# Patient Record
Sex: FEMALE | Race: WHITE | URBAN - METROPOLITAN AREA
[De-identification: names, ages, dates, MRNs, and addresses within clinical notes are randomized per-mention and may not be internally consistent; named-entity substitution may affect disease eponyms.]

---

## 2022-12-12 ENCOUNTER — REFRACTIVE (OUTPATIENT)
Dept: URBAN - METROPOLITAN AREA CLINIC 4 | Facility: CLINIC | Age: 22
End: 2022-12-12

## 2022-12-12 DIAGNOSIS — H53.8 OTHER VISUAL DISTURBANCES: Primary | ICD-10-CM

## 2022-12-12 ASSESSMENT — KERATOMETRY
OS: 45.13
OD: 45.13

## 2023-01-23 ENCOUNTER — REFRACTIVE (OUTPATIENT)
Dept: URBAN - METROPOLITAN AREA CLINIC 4 | Facility: CLINIC | Age: 23
End: 2023-01-23

## 2023-01-23 DIAGNOSIS — H53.8 OTHER VISUAL DISTURBANCES: Primary | ICD-10-CM

## 2023-01-23 RX ORDER — DIAZEPAM 5 MG/1
5 MG TABLET ORAL
Qty: 2 | Refills: 0 | Status: INACTIVE
Start: 2023-01-23 | End: 2023-01-23

## 2023-01-23 RX ORDER — DIAZEPAM 5 MG/1
5 MG TABLET ORAL
Qty: 3 | Refills: 0 | Status: ACTIVE
Start: 2023-01-23

## 2023-01-23 ASSESSMENT — VISUAL ACUITY
OS: 20/20
OD: 20/25

## 2023-01-23 ASSESSMENT — INTRAOCULAR PRESSURE
OS: 17
OD: 17

## 2023-01-23 ASSESSMENT — KERATOMETRY
OD: 45.25
OS: 45.00

## 2023-01-25 ENCOUNTER — POST-OPERATIVE VISIT (OUTPATIENT)
Dept: URBAN - METROPOLITAN AREA CLINIC 4 | Facility: CLINIC | Age: 23
End: 2023-01-25

## 2023-01-25 DIAGNOSIS — Z48.810 ENCOUNTER FOR SURGICAL AFTERCARE FOLLOWING SURGERY ON A SENSE ORGAN: Primary | ICD-10-CM

## 2023-01-25 PROCEDURE — 99024 POSTOP FOLLOW-UP VISIT: CPT | Performed by: OPHTHALMOLOGY

## 2023-01-25 NOTE — IMPRESSION/PLAN
Impression: S/P LASIK - [facility] OU - 1 Day. Encounter for surgical aftercare following surgery on a sense organ  Z48.810. Plan: POD#1 s/p LASIK both eyes- healing well, flap lift SPK, 1+ edema. CL placed OU. Continue Pred-Moxi eye drops BID for one for day and QID until next visit. Cont. Vitamin C 1,000mg PO QD, sunglasses protection and preservative free artificial tears [id number] while awake until next visit. Patient instructed to continue precautions of not rubbing the eyes, to sleep with protective goggles until the next visit. F/U in 1 week, sooner PRN.

## 2023-01-28 ENCOUNTER — POST-OPERATIVE VISIT (OUTPATIENT)
Dept: URBAN - METROPOLITAN AREA CLINIC 4 | Facility: CLINIC | Age: 23
End: 2023-01-28

## 2023-01-28 PROCEDURE — 99024 POSTOP FOLLOW-UP VISIT: CPT | Performed by: OPHTHALMOLOGY

## 2023-02-25 ENCOUNTER — POST-OPERATIVE VISIT (OUTPATIENT)
Dept: URBAN - METROPOLITAN AREA CLINIC 4 | Facility: CLINIC | Age: 23
End: 2023-02-25

## 2023-02-25 DIAGNOSIS — Z48.810 ENCOUNTER FOR SURGICAL AFTERCARE FOLLOWING SURGERY ON A SENSE ORGAN: Primary | ICD-10-CM

## 2023-02-25 PROCEDURE — 99024 POSTOP FOLLOW-UP VISIT: CPT | Performed by: OPHTHALMOLOGY

## 2023-02-25 ASSESSMENT — INTRAOCULAR PRESSURE
OS: 16
OD: 16

## 2023-02-25 ASSESSMENT — KERATOMETRY
OD: 37.88
OS: 38.13

## 2023-02-25 NOTE — IMPRESSION/PLAN
Impression: S/P LASIK - [facility] OU - 1 Day. Encounter for surgical aftercare following surgery on a sense organ  Z48.810. Plan: POM#1 s/p LASIK both eyes - well healed, off Pred-Moxi, patient happy with results. Stop Vitamin C. Cont. sunglasses protection and preservative free artificial tears QID prn. F/U in 5 months for a repeat dilated exam and final PO visit.

## 2023-07-31 ENCOUNTER — POST-OPERATIVE VISIT (OUTPATIENT)
Dept: URBAN - METROPOLITAN AREA CLINIC 4 | Facility: CLINIC | Age: 23
End: 2023-07-31

## 2023-07-31 DIAGNOSIS — Z48.810 ENCOUNTER FOR SURGICAL AFTERCARE FOLLOWING SURGERY ON A SENSE ORGAN: ICD-10-CM

## 2023-07-31 DIAGNOSIS — H47.11 PAPILLEDEMA ASSOCIATED WITH INCREASED INTRACRANIAL PRESSURE: Primary | ICD-10-CM

## 2023-07-31 PROCEDURE — 99024 POSTOP FOLLOW-UP VISIT: CPT | Performed by: OPHTHALMOLOGY

## 2023-07-31 RX ORDER — ACETAZOLAMIDE 500 MG/1
500 MG CAPSULE, EXTENDED RELEASE ORAL
Qty: 60 | Refills: 0 | Status: ACTIVE
Start: 2023-07-31

## 2023-07-31 ASSESSMENT — KERATOMETRY
OS: 38.50
OD: 37.88

## 2023-07-31 ASSESSMENT — INTRAOCULAR PRESSURE
OS: 16
OD: 13